# Patient Record
Sex: FEMALE | Race: WHITE | NOT HISPANIC OR LATINO | Employment: PART TIME | URBAN - METROPOLITAN AREA
[De-identification: names, ages, dates, MRNs, and addresses within clinical notes are randomized per-mention and may not be internally consistent; named-entity substitution may affect disease eponyms.]

---

## 2024-12-12 ENCOUNTER — APPOINTMENT (OUTPATIENT)
Dept: PRIMARY CARE | Facility: CLINIC | Age: 74
End: 2024-12-12
Payer: COMMERCIAL

## 2024-12-31 ENCOUNTER — APPOINTMENT (OUTPATIENT)
Dept: PRIMARY CARE | Facility: CLINIC | Age: 74
End: 2024-12-31
Payer: COMMERCIAL

## 2025-01-07 ENCOUNTER — APPOINTMENT (OUTPATIENT)
Dept: PRIMARY CARE | Facility: CLINIC | Age: 75
End: 2025-01-07
Payer: COMMERCIAL

## 2025-01-07 VITALS
DIASTOLIC BLOOD PRESSURE: 70 MMHG | WEIGHT: 189 LBS | SYSTOLIC BLOOD PRESSURE: 130 MMHG | HEIGHT: 66 IN | OXYGEN SATURATION: 95 % | BODY MASS INDEX: 30.37 KG/M2 | HEART RATE: 95 BPM

## 2025-01-07 DIAGNOSIS — L98.9 SKIN LESION: Primary | ICD-10-CM

## 2025-01-07 RX ORDER — LOSARTAN POTASSIUM 50 MG/1
50 TABLET ORAL 2 TIMES DAILY
COMMUNITY

## 2025-01-07 ASSESSMENT — PATIENT HEALTH QUESTIONNAIRE - PHQ9
SUM OF ALL RESPONSES TO PHQ9 QUESTIONS 1 AND 2: 0
1. LITTLE INTEREST OR PLEASURE IN DOING THINGS: NOT AT ALL
2. FEELING DOWN, DEPRESSED OR HOPELESS: NOT AT ALL

## 2025-01-07 NOTE — PROGRESS NOTES
"Chief Complaint Maraim Mccallum is a 74 y.o. female who presents for lesion on face    HPI:  Pt states that she has had a spot develop on her face over the last several months. It started as a red spot then became sore with pus and became pimple like then cleared up back to a red spot. No bleeding. Has no history of skin cancer. Uses sunscreen daily. No bad sunburns in her past. No other spots that she is concerned of.    ROS:  Review of Systems   Constitutional:  Negative for appetite change, chills, fever and unexpected weight change.   Genitourinary:  Negative for dysuria and enuresis.   Skin:         Spot on face   Neurological:  Negative for dizziness, syncope, weakness, light-headedness and headaches.       Meds:    Current Outpatient Medications:     losartan (Cozaar) 50 mg tablet, Take 1 tablet (50 mg) by mouth 2 times a day., Disp: , Rfl:     Allergies:  Allergies   Allergen Reactions    Penicillins Rash       PE:  Visit Vitals  /70   Pulse 95   Ht 1.676 m (5' 6\")   Wt 85.7 kg (189 lb)   SpO2 95%   BMI 30.51 kg/m²   Smoking Status Never   BSA 2 m²     Physical Exam  Constitutional:       Appearance: Normal appearance. She is obese.   HENT:      Head: Normocephalic and atraumatic.      Mouth/Throat:      Mouth: Mucous membranes are moist.      Pharynx: Oropharynx is clear.   Eyes:      Extraocular Movements: Extraocular movements intact.      Conjunctiva/sclera: Conjunctivae normal.   Cardiovascular:      Rate and Rhythm: Normal rate and regular rhythm.      Heart sounds: Normal heart sounds.   Pulmonary:      Effort: Pulmonary effort is normal.      Breath sounds: Normal breath sounds.   Musculoskeletal:      Right lower leg: No edema.      Left lower leg: No edema.   Skin:     General: Skin is warm and dry.      Capillary Refill: Capillary refill takes less than 2 seconds.      Findings: Lesion present.      Comments: Flesh color lesion with telangiitis  on the alar region of face   Neurological:    "   General: No focal deficit present.      Mental Status: She is alert and oriented to person, place, and time. Mental status is at baseline.   Psychiatric:         Mood and Affect: Mood normal.         Behavior: Behavior normal.         Thought Content: Thought content normal.         Judgment: Judgment normal.           Assessment/Plan  Mariam Mccallum is a 74 y.o. female who presents for spot on face. Due to the pearly appearance of the lesion with some redness there is concern for basal cell carcinoma. It was recommended that patient follow up with dermatology. Patient was referred to dermatology for biopsy and general skin check. Patient agreed with treatment and plan.    Mariam was seen today for follow-up.  Diagnoses and all orders for this visit:  Skin lesion (Primary)  -     Referral to Dermatology         Follow up with dermatology      Patient was staffed with Dr. Jaret Arguelles DO, PGY-3  [facility]

## 2025-01-08 ASSESSMENT — ENCOUNTER SYMPTOMS
HEADACHES: 0
WEAKNESS: 0
FEVER: 0
ROS SKIN COMMENTS: SPOT ON FACE
DIZZINESS: 0
LIGHT-HEADEDNESS: 0
DYSURIA: 0
APPETITE CHANGE: 0
CHILLS: 0
UNEXPECTED WEIGHT CHANGE: 0